# Patient Record
Sex: FEMALE | Race: WHITE
[De-identification: names, ages, dates, MRNs, and addresses within clinical notes are randomized per-mention and may not be internally consistent; named-entity substitution may affect disease eponyms.]

---

## 2019-04-29 ENCOUNTER — HOSPITAL ENCOUNTER (OUTPATIENT)
Dept: HOSPITAL 47 - RADUSWWP | Age: 24
Discharge: HOME | End: 2019-04-29
Payer: MEDICAID

## 2019-04-29 DIAGNOSIS — N93.0: Primary | ICD-10-CM

## 2019-04-29 PROCEDURE — 76830 TRANSVAGINAL US NON-OB: CPT

## 2019-04-30 NOTE — US
EXAMINATION TYPE: US transvaginal

 

DATE OF EXAM: 4/29/2019

 

COMPARISON: NONE

 

CLINICAL HISTORY: N93.0 Postcoital and contact bleeding. Vaginal bleeding during intercourse with penelope
n 

 

TECHNIQUE:  Transvaginal (TV).  

 

Date of LMP:  04/22/19

 

EXAM MEASUREMENTS:

 

Uterus:  7.0 x 3.3 x 4.2 cm

Endometrial Stripe: 0.6 cm

Right Ovary:  2.7 x 2.2 x2.6 cm

Left Ovary:  3.4 x 2.2 x 2.3 cm

 

 

 

1. Uterus:  Anteverted   there is some impression upon the lower uterine segment/cervix junction ante
riorly as there is contour abnormality on image 11/44. However this area is isoechoic and could repre
sent a small fibroid. This is not reproduced on the sagittal images.

2. Endometrium:  wnl

3. Right Ovary:  follicles noted

4. Left Ovary:  complex cystic area measuring 2.2 x 1.4 x 2.2cm versus more likely multiple adjacent 
follicles.

5. Bilateral Adnexa:  wnl

6. Posterior cul-de-sac:  wnl

 

IMPRESSION: 

1. Smooth contour abnormality of the lower uterine segment/cervix junction may represent an ill-defin
ed isoechoic small fibroid. Given this patient's history of postcoital bleeding ensure negative cervi
mauro physical examination and Pap smear.

2. In addition to bilateral physiologic follicles there are either multiple adjacent clustered follic
les in the left ovary versus a less likely complex cystic lesion. Follow-up could be performed in 3 m
[facility] to ensure no interval enlargement.